# Patient Record
Sex: MALE | Race: WHITE | NOT HISPANIC OR LATINO | Employment: UNEMPLOYED | URBAN - METROPOLITAN AREA
[De-identification: names, ages, dates, MRNs, and addresses within clinical notes are randomized per-mention and may not be internally consistent; named-entity substitution may affect disease eponyms.]

---

## 2022-06-26 ENCOUNTER — HOSPITAL ENCOUNTER (OUTPATIENT)
Dept: TELEMEDICINE | Facility: HOSPITAL | Age: 57
Discharge: HOME | End: 2022-06-26
Payer: MEDICARE

## 2022-06-26 PROCEDURE — G0426 PR INPT TELEHEALTH CONSULT 50M: ICD-10-PCS | Mod: 95,,, | Performed by: PSYCHIATRY & NEUROLOGY

## 2022-06-26 PROCEDURE — G0426 INPT/ED TELECONSULT50: HCPCS | Mod: 95,,, | Performed by: PSYCHIATRY & NEUROLOGY

## 2022-06-26 NOTE — CONSULTS
[facility]  Vascular Neurology  Comprehensive Stroke Center  TeleVascular Neurology Acute Consultation Note      Consults    Consulting Provider: BURAK PROVIDER  Current Providers  No providers found    Patient Location: [facility] TELEMEDICINE ED [facility] Emergency Department  Spoke hospital nurse at bedside with patient assisting consultant.     Patient information was obtained from patient.         Assessment/Plan:   Location: [facility] LKN: 6/25/2022 at 2300 Patient Symptoms are aphasia per ER triage. He woke-up with these symptoms at 1 pm today.   CT images done, not available for review,     CT head per ER physician is no acute intra-cranial process. Chronic microvascular ischemic changes. Bilateral lacunar infarcts in BG. I tried to retreive it and it does not show up in my PACS. He does not appear large vessel occlusion but I recommend obtaining intracranial vessel study. He is out of tPA candidate.     Oral aspirin 81 mg now.  Head of bed flat, IV Fluids, permissive hypertension  MRI brain/MRA head without contrast  Neuro consult if in house available or transfer for neuro consult  Stroke/TIA work-up with MRI brain, Echo, PT/OT/ Speech and swallow evaluation.  If MRA -ve for occlusion, recommend loading Plavix load 300 mg today per POINT/CHANCE protocol today and from tomorrow 81 mg aspirin and plavix 75 mg for 21 days followed by mono antiplatelet.  They will call me if MRA results are abnormal.       Diagnoses:   Stroke like symptoms    STROKE DOCUMENTATION     Acute Stroke Times:   Acute Stroke Times   Last Known Normal Date: 06/25/22  Last Known Normal Time: 2300  Stroke Team Called Date: 06/26/22  Stroke Team Called Time: 1432  Stroke Team Arrival Date: 06/26/22  Stroke Team Arrival Time: 1435  CT completed but images not available for review - spoke to document results: 1440  Alteplase Recommended: No    NIH Scale:  1a. Level of  Consciousness: 0-->Alert, keenly responsive  1b. LOC Questions: 0-->Answers both questions correctly  1c. LOC Commands: 0-->Performs both tasks correctly  2. Best Gaze: 0-->Normal  3. Visual: 0-->No visual loss  4. Facial Palsy: 0-->Normal symmetrical movements  5a. Motor Arm, Left: 0-->No drift, limb holds 90 (or 45) degrees for full 10 secs  5b. Motor Arm, Right: 0-->No drift, limb holds 90 (or 45) degrees for full 10 secs  6a. Motor Leg, Left: 0-->No drift, leg holds 30 degree position for full 5 secs  6b. Motor Leg, Right: 0-->No drift, leg holds 30 degree position for full 5 secs  7. Limb Ataxia: 0-->Absent  8. Sensory: 0-->Normal, no sensory loss  9. Best Language: 0-->No aphasia, normal  10. Dysarthria: 0-->Normal  11. Extinction and Inattention (formerly Neglect): 0-->No abnormality  Total (NIH Stroke Scale): 0     Modified Lizbeth    Sofie Coma Scale:    ABCD2 Score:    [id number] Score:   HAS -BLED Score:   ICH Score:   Hunt & Mccracken Classification:       There were no vitals taken for this visit.  Alteplase Eligible?: Yes  Alteplase Recommendation: Alteplase not recommended due to Outside of treatment window   Possible Interventional Revascularization Candidate? Awaiting CTA results from [facility] for determination     Disposition Recommendation: pending further studies    Subjective:     History of Present Illness:  [facility] Patient Symptoms are aphasia per ER triage. He woke-up with these symptoms at 1 pm today.           Woke up with symptoms?: yes    Recent bleeding noted: no  Does the patient take any Blood Thinners? unknown; no  Medications: No relevant medications      Past Medical History: hypertension, diabetes and hyperlipidemia    Past Surgical History: no major surgeries within the last 2 weeks    Family History: no relevant history    Social History: unable to obtain    Allergies: Allergies have not been reviewed No relevant allergies    Review of  Systems  Objective:   Vitals: There were no vitals taken for this visit. BP: 156/98    CT READ: No    Physical Exam          Recommended the emergency room physician to have a brief discussion with the patient and/or family if available regarding the  risks and benefits of treatment, and to briefly document the occurrence of that discussion in his clinical encounter note.     The attending portion of this evaluation, treatment, and documentation was performed per Analy Sykes MD via audiovisual.    Billing code:  (non-intervention mild to moderate stroke, TIA, some mimics)    · This patient has a critical neurological condition/illness, with some potential for high morbidity and mortality.  · There is a moderate probability for acute neurological change leading to clinical and possibly life-threatening deterioration requiring highest level of physician preparedness for urgent intervention.  · Care was coordinated with other physicians involved in the patient's care.  · Radiologic studies and laboratory data were reviewed and interpreted, and plan of care was re-assessed based on the results.  · Diagnosis, treatment options and prognosis may have been discussed with the patient and/or family members or caregiver.      In your opinion, this was a: Tier 1 Van Negative    Consult End Time: 2:56 PM     Analy Sykes MD  Comprehensive Stroke Center  Vascular Neurology   [facility]

## 2022-06-26 NOTE — HPI
Location: [facility] LKN: 6/25/2022 at 2300 Patient Symptoms are aphasia per ER triage. He woke-up with these symptoms at 1 pm today.

## 2022-06-26 NOTE — SUBJECTIVE & OBJECTIVE
Woke up with symptoms?: yes    Recent bleeding noted: no  Does the patient take any Blood Thinners? unknown; no  Medications: No relevant medications      Past Medical History: hypertension, diabetes and hyperlipidemia    Past Surgical History: no major surgeries within the last 2 weeks    Family History: no relevant history    Social History: unable to obtain    Allergies: Allergies have not been reviewed No relevant allergies    Review of Systems  Objective:   Vitals: There were no vitals taken for this visit. BP: 156/98    CT READ: No    Physical Exam

## 2022-06-27 ENCOUNTER — HOSPITAL ENCOUNTER (EMERGENCY)
Facility: HOSPITAL | Age: 57
Discharge: HOME | End: 2022-06-27
Attending: STUDENT IN AN ORGANIZED HEALTH CARE EDUCATION/TRAINING PROGRAM
Payer: MEDICARE

## 2022-06-27 VITALS
OXYGEN SATURATION: 97 % | TEMPERATURE: 98 F | RESPIRATION RATE: 20 BRPM | HEIGHT: 71 IN | DIASTOLIC BLOOD PRESSURE: 101 MMHG | WEIGHT: 197.31 LBS | BODY MASS INDEX: 27.62 KG/M2 | HEART RATE: 109 BPM | SYSTOLIC BLOOD PRESSURE: 175 MMHG

## 2022-06-27 DIAGNOSIS — N50.819 TESTICULAR PAIN: ICD-10-CM

## 2022-06-27 DIAGNOSIS — G45.9 TIA (TRANSIENT ISCHEMIC ATTACK): Primary | ICD-10-CM

## 2022-06-27 DIAGNOSIS — R53.1 WEAKNESS: ICD-10-CM

## 2022-06-27 LAB
ALBUMIN SERPL-MCNC: 4.1 GM/DL (ref 3.5–5)
ALBUMIN/GLOB SERPL: 1.4 RATIO (ref 1.1–2)
ALP SERPL-CCNC: 91 UNIT/L (ref 40–150)
ALT SERPL-CCNC: 33 UNIT/L (ref 0–55)
AMPHET UR QL SCN: NEGATIVE
APPEARANCE UR: CLEAR
AST SERPL-CCNC: 24 UNIT/L (ref 5–34)
BACTERIA #/AREA URNS AUTO: NORMAL /HPF
BARBITURATE SCN PRESENT UR: NEGATIVE
BASOPHILS # BLD AUTO: 0.05 X10(3)/MCL (ref 0–0.2)
BASOPHILS NFR BLD AUTO: 0.6 %
BENZODIAZ UR QL SCN: NEGATIVE
BILIRUB UR QL STRIP.AUTO: NEGATIVE MG/DL
BILIRUBIN DIRECT+TOT PNL SERPL-MCNC: 0.4 MG/DL
BUN SERPL-MCNC: 10.7 MG/DL (ref 8.4–25.7)
CALCIUM SERPL-MCNC: 10 MG/DL (ref 8.4–10.2)
CANNABINOIDS UR QL SCN: POSITIVE
CHLORIDE SERPL-SCNC: 100 MMOL/L (ref 98–107)
CO2 SERPL-SCNC: 33 MMOL/L (ref 22–29)
COCAINE UR QL SCN: NEGATIVE
COLOR UR AUTO: YELLOW
CREAT SERPL-MCNC: 0.82 MG/DL (ref 0.73–1.18)
EOSINOPHIL # BLD AUTO: 0.13 X10(3)/MCL (ref 0–0.9)
EOSINOPHIL NFR BLD AUTO: 1.7 %
ERYTHROCYTE [DISTWIDTH] IN BLOOD BY AUTOMATED COUNT: 14.1 % (ref 11.5–17)
ETHANOL SERPL-MCNC: <10 MG/DL
FENTANYL UR QL SCN: NEGATIVE
FLUAV AG UPPER RESP QL IA.RAPID: NOT DETECTED
FLUBV AG UPPER RESP QL IA.RAPID: NOT DETECTED
GLOBULIN SER-MCNC: 2.9 GM/DL (ref 2.4–3.5)
GLUCOSE SERPL-MCNC: 173 MG/DL (ref 74–100)
GLUCOSE UR QL STRIP.AUTO: ABNORMAL MG/DL
HCT VFR BLD AUTO: 47.7 % (ref 42–52)
HGB BLD-MCNC: 16 GM/DL (ref 14–18)
IMM GRANULOCYTES # BLD AUTO: 0.09 X10(3)/MCL (ref 0–0.02)
IMM GRANULOCYTES NFR BLD AUTO: 1.2 % (ref 0–0.43)
KETONES UR QL STRIP.AUTO: NEGATIVE MG/DL
LEUKOCYTE ESTERASE UR QL STRIP.AUTO: NEGATIVE UNIT/L
LIPASE SERPL-CCNC: 154 U/L
LYMPHOCYTES # BLD AUTO: 1.78 X10(3)/MCL (ref 0.6–4.6)
LYMPHOCYTES NFR BLD AUTO: 23 %
MCH RBC QN AUTO: 30.9 PG (ref 27–31)
MCHC RBC AUTO-ENTMCNC: 33.5 MG/DL (ref 33–36)
MCV RBC AUTO: 92.3 FL (ref 80–94)
MDMA UR QL SCN: NEGATIVE
MONOCYTES # BLD AUTO: 0.95 X10(3)/MCL (ref 0.1–1.3)
MONOCYTES NFR BLD AUTO: 12.3 %
NEUTROPHILS # BLD AUTO: 4.8 X10(3)/MCL (ref 2.1–9.2)
NEUTROPHILS NFR BLD AUTO: 61.2 %
NITRITE UR QL STRIP.AUTO: NEGATIVE
NRBC BLD AUTO-RTO: 0 %
OPIATES UR QL SCN: NEGATIVE
PCP UR QL: NEGATIVE
PH UR STRIP.AUTO: 7 [PH]
PH UR: 7 [PH] (ref 3–11)
PLATELET # BLD AUTO: 219 X10(3)/MCL (ref 130–400)
PMV BLD AUTO: 11.7 FL (ref 9.4–12.4)
POTASSIUM SERPL-SCNC: 4.9 MMOL/L (ref 3.5–5.1)
PROT SERPL-MCNC: 7 GM/DL (ref 6.4–8.3)
PROT UR QL STRIP.AUTO: ABNORMAL MG/DL
RBC # BLD AUTO: 5.17 X10(6)/MCL (ref 4.7–6.1)
RBC #/AREA URNS AUTO: <5 /HPF
RBC UR QL AUTO: NEGATIVE UNIT/L
SARS-COV-2 RNA RESP QL NAA+PROBE: NOT DETECTED
SODIUM SERPL-SCNC: 140 MMOL/L (ref 136–145)
SP GR UR STRIP.AUTO: 1.01 (ref 1–1.03)
SPECIFIC GRAVITY, URINE AUTO (.000) (OHS): 1.01 (ref 1–1.03)
SQUAMOUS #/AREA URNS AUTO: <5 /HPF
TROPONIN I SERPL-MCNC: <0.01 NG/ML (ref 0–0.04)
UROBILINOGEN UR STRIP-ACNC: 0.2 MG/DL
WBC # SPEC AUTO: 7.8 X10(3)/MCL (ref 4.5–11.5)
WBC #/AREA URNS AUTO: <5 /HPF

## 2022-06-27 PROCEDURE — 80053 COMPREHEN METABOLIC PANEL: CPT | Performed by: PHYSICIAN ASSISTANT

## 2022-06-27 PROCEDURE — 80307 DRUG TEST PRSMV CHEM ANLYZR: CPT | Performed by: STUDENT IN AN ORGANIZED HEALTH CARE EDUCATION/TRAINING PROGRAM

## 2022-06-27 PROCEDURE — 83690 ASSAY OF LIPASE: CPT | Performed by: PHYSICIAN ASSISTANT

## 2022-06-27 PROCEDURE — 99285 EMERGENCY DEPT VISIT HI MDM: CPT | Mod: 25,CS

## 2022-06-27 PROCEDURE — 87636 SARSCOV2 & INF A&B AMP PRB: CPT | Performed by: PHYSICIAN ASSISTANT

## 2022-06-27 PROCEDURE — 84484 ASSAY OF TROPONIN QUANT: CPT | Performed by: PHYSICIAN ASSISTANT

## 2022-06-27 PROCEDURE — 93005 ELECTROCARDIOGRAM TRACING: CPT

## 2022-06-27 PROCEDURE — 81001 URINALYSIS AUTO W/SCOPE: CPT | Performed by: PHYSICIAN ASSISTANT

## 2022-06-27 PROCEDURE — 82077 ASSAY SPEC XCP UR&BREATH IA: CPT | Performed by: STUDENT IN AN ORGANIZED HEALTH CARE EDUCATION/TRAINING PROGRAM

## 2022-06-27 PROCEDURE — 36415 COLL VENOUS BLD VENIPUNCTURE: CPT | Performed by: PHYSICIAN ASSISTANT

## 2022-06-27 PROCEDURE — 85025 COMPLETE CBC W/AUTO DIFF WBC: CPT | Performed by: PHYSICIAN ASSISTANT

## 2022-06-27 PROCEDURE — 63600175 PHARM REV CODE 636 W HCPCS: Performed by: STUDENT IN AN ORGANIZED HEALTH CARE EDUCATION/TRAINING PROGRAM

## 2022-06-27 PROCEDURE — 25000003 PHARM REV CODE 250: Performed by: STUDENT IN AN ORGANIZED HEALTH CARE EDUCATION/TRAINING PROGRAM

## 2022-06-27 RX ORDER — PANTOPRAZOLE SODIUM 40 MG/1
40 TABLET, DELAYED RELEASE ORAL DAILY
COMMUNITY
Start: 2022-06-06

## 2022-06-27 RX ORDER — OXYCODONE AND ACETAMINOPHEN 10; 325 MG/1; MG/1
1 TABLET ORAL 3 TIMES DAILY
COMMUNITY
Start: 2022-06-06

## 2022-06-27 RX ORDER — METFORMIN HYDROCHLORIDE 1000 MG/1
1000 TABLET ORAL 2 TIMES DAILY
COMMUNITY
Start: 2022-05-16

## 2022-06-27 RX ORDER — ATORVASTATIN CALCIUM 40 MG/1
40 TABLET, FILM COATED ORAL DAILY
Qty: 30 TABLET | Refills: 0 | Status: SHIPPED | OUTPATIENT
Start: 2022-06-27 | End: 2022-07-27

## 2022-06-27 RX ORDER — CLOPIDOGREL BISULFATE 75 MG/1
75 TABLET ORAL DAILY
Qty: 30 TABLET | Refills: 0 | Status: SHIPPED | OUTPATIENT
Start: 2022-06-27 | End: 2022-07-27

## 2022-06-27 RX ORDER — FAMOTIDINE 40 MG/1
40 TABLET, FILM COATED ORAL DAILY
COMMUNITY
Start: 2022-04-20

## 2022-06-27 RX ORDER — AMITRIPTYLINE HYDROCHLORIDE 100 MG/1
100 TABLET ORAL NIGHTLY
COMMUNITY
Start: 2022-05-17

## 2022-06-27 RX ORDER — PIOGLITAZONEHYDROCHLORIDE 30 MG/1
30 TABLET ORAL EVERY MORNING
COMMUNITY
Start: 2022-06-22

## 2022-06-27 RX ORDER — AMLODIPINE BESYLATE 10 MG/1
10 TABLET ORAL EVERY MORNING
COMMUNITY
Start: 2022-06-22

## 2022-06-27 RX ORDER — GABAPENTIN 400 MG/1
400 CAPSULE ORAL 3 TIMES DAILY
COMMUNITY
Start: 2022-05-17

## 2022-06-27 RX ORDER — BUSPIRONE HYDROCHLORIDE 15 MG/1
15 TABLET ORAL 2 TIMES DAILY
COMMUNITY
Start: 2022-05-16

## 2022-06-27 RX ORDER — VENLAFAXINE HYDROCHLORIDE 75 MG/1
75 CAPSULE, EXTENDED RELEASE ORAL EVERY MORNING
COMMUNITY
Start: 2022-06-22

## 2022-06-27 RX ORDER — HYDROCHLOROTHIAZIDE 12.5 MG/1
12.5 CAPSULE ORAL EVERY MORNING
COMMUNITY
Start: 2022-06-22

## 2022-06-27 RX ORDER — METFORMIN HYDROCHLORIDE 500 MG/1
500 TABLET ORAL 2 TIMES DAILY
COMMUNITY
Start: 2022-06-22

## 2022-06-27 RX ORDER — ASPIRIN 325 MG
325 TABLET, DELAYED RELEASE (ENTERIC COATED) ORAL
Status: COMPLETED | OUTPATIENT
Start: 2022-06-27 | End: 2022-06-27

## 2022-06-27 RX ORDER — TIZANIDINE 4 MG/1
4 TABLET ORAL DAILY
COMMUNITY
Start: 2022-05-17

## 2022-06-27 RX ORDER — LISINOPRIL 20 MG/1
20 TABLET ORAL EVERY MORNING
COMMUNITY
Start: 2022-06-22

## 2022-06-27 RX ORDER — TESTOSTERONE CYPIONATE 200 MG/ML
10 INJECTION, SOLUTION INTRAMUSCULAR
COMMUNITY
Start: 2022-04-16

## 2022-06-27 RX ADMIN — ASPIRIN 325 MG: 325 TABLET, COATED ORAL at 07:06

## 2022-06-27 RX ADMIN — SODIUM CHLORIDE, POTASSIUM CHLORIDE, SODIUM LACTATE AND CALCIUM CHLORIDE 1000 ML: 600; 310; 30; 20 INJECTION, SOLUTION INTRAVENOUS at 07:06

## 2022-06-27 NOTE — FIRST PROVIDER EVALUATION
"Medical screening exam completed.  I have conducted a focused provider triage encounter, findings are as follows:    Brief history of present illness:  Mr. Norman presented to the ED with abdominal pain, dysuria, and weakness.  No fever.  Patient reports seen at [facility] yesterday for TIA.  Patient reports that he signed out AMA from the floor because nurses would not give him his night meds.    Vitals:    06/27/22 1446   BP: (!) 164/88   Pulse: (!) 120   Resp: 18   Temp: 98.4 °F (36.9 °C)   TempSrc: Oral   SpO2: 98%   Weight: 89.5 kg (197 lb 5 oz)   Height: 5' 10.87" (1.8 m)       Pertinent physical exam:  Patient is not toxic appearing.  No respiratory distress.    Brief workup plan:  Labs, UA, Imaginin    Preliminary workup initiated; this workup will be continued and followed by the physician or advanced practice provider that is assigned to the patient when roomed.  "

## 2022-06-28 NOTE — DISCHARGE INSTRUCTIONS
Follow up with your primary care physician.     Follow up with your neurologist.     On imaging done at [facility], you were found to have a dilated aorta, this will need follow up and close monitoring.  Talk to your primary care physician.     Take medication as prescribed.    Return to the emergency department if any new symptoms, worsening pain, weakness, numbness, nausea, vomiting, bleeding, or any other symptoms.

## 2022-06-28 NOTE — ED PROVIDER NOTES
Encounter Date: 6/27/2022       History     Chief Complaint   Patient presents with    Abdominal Pain     Constipation, difficulty urinating, intermittent testicular pain x 1 month, pt wants further testing, supposed to have a MRI on head at [facility], out of medicines x 3 days. Pt worked up for cva at [facility], left [facility] last night, pt here for MRI of head     The history is provided by the patient.   Abdominal Pain  The current episode started several weeks ago. The onset of the illness was gradual. The problem has not changed since onset.The abdominal pain is generalized. The abdominal pain does not radiate. The abdominal pain is relieved by nothing.   The other symptoms of the illness do not include fever, shortness of breath or dysuria.   Significant associated medical issues do not include GERD.      Mr. Edward Norman is a 56-year-old male with past medical history of hypertension hyperlipidemia, diabetes history of TIA previously presents to the emergency department for multiple medical complaints.  He reports chronic abdominal pain, and intermittent testicular pain x1 month.  Denies any current testicular swelling.  Denies any nausea, vomiting, fever.  Patient with left against medical advice from [facility] yesterday and states he left because he was not getting his medication.  Patient had a full CVA workup including MRI done.  Spoke with the patient and his sister are about risks of leaving against medical advice.  Fortunately were able to request records from [facility] and review the complete workup he has had done.  He had initially presented there for aphasia, was given an NIH of 2 had evaluation by neurology [facility] stroke, CT of the head and MRI were obtained.  Patient was not a tPA candidate.  Concurrently patient without any new neuro deficits.    CT of the head from 06/26/2022 in  Impression:  Chronic small-vessel ischemic changes in the periventricular distribution  bilateral multiple small lacunar infarcts.  These acute infarcts are small and difficult to determine whether 1 of these infarcts could be acute.  Follow-up MRI may be helpful.    MRI of the brain 06/26/2022  Findings:  There significant chronic small-vessel ischemic changes in the periventricular distribution bilaterally.  This small old infarct involving the left corona radiata.  There is a small old infarct involving the left thalamus.  No evidence of acute infarct.  No intracranial hemorrhage.  No midline shift or hydrocephalus.  The visualized portions of the orbits and paranasal sinuses are unremarkable.  Internal auditory canals and pituitary fossa are unremarkable.  Impression:  Chronic small-vessel ischemic changes.  No evidence of acute infarct.    MRI neck carotid 06/26/2022  No evidence of carotid stenosis.  Ascending aorta is prominent measuring 4.7 cm.     MRA of the brain:  Findings suggesting small vessel atherosclerotic changes along the distal branch of the middle cerebral arteries bilaterally also mid atherosclerotic changes involving the distal portions of the posterior cerebral arteries bilaterally.    EKG from outside facility 06/26/2022.  Sinus tachycardia.  Rate 104. Normal intervals.  No STEMI.    Review of patient's allergies indicates:  No Known Allergies  History reviewed. No pertinent past medical history.  History reviewed. No pertinent surgical history.  History reviewed. No pertinent family history.     Review of Systems   Constitutional: Negative for fever.   HENT: Negative for sore throat.    Eyes: Negative for visual disturbance.   Respiratory: Negative for shortness of breath.    Cardiovascular: Negative for chest pain.   Gastrointestinal: Positive for abdominal pain.   Genitourinary: Negative for dysuria.   Musculoskeletal: Negative for joint swelling.   Skin: Negative for rash.   Neurological: Negative for weakness.   Psychiatric/Behavioral: Negative for confusion.   All other  systems reviewed and are negative.      Physical Exam     Initial Vitals [06/27/22 1446]   BP Pulse Resp Temp SpO2   (!) 164/88 (!) 120 18 98.4 °F (36.9 °C) 98 %      MAP       --         Physical Exam    Nursing note and vitals reviewed.  Constitutional: He appears well-developed and well-nourished. He is not diaphoretic. No distress.   HENT:   Head: Normocephalic and atraumatic.   Eyes: Conjunctivae and EOM are normal. Pupils are equal, round, and reactive to light.   Neck:   Normal range of motion.  Cardiovascular: Normal rate, regular rhythm, normal heart sounds and intact distal pulses.   No murmur heard.  Pulmonary/Chest: Breath sounds normal. No respiratory distress. He has no wheezes. He has no rales.   Abdominal: Abdomen is soft. He exhibits no distension. There is no abdominal tenderness.   Genitourinary:    Genitourinary Comments: No testicular pain, swelling, erythema noted.      Musculoskeletal:         General: No tenderness or edema. Normal range of motion.      Cervical back: Normal range of motion.     Neurological: He is alert and oriented to person, place, and time. He displays normal reflexes. No cranial nerve deficit or sensory deficit.   Skin: Skin is warm and dry. Capillary refill takes less than 2 seconds. No rash noted. No erythema.   Psychiatric: He has a normal mood and affect.         ED Course   Procedures  Labs Reviewed   COMPREHENSIVE METABOLIC PANEL - Abnormal; Notable for the following components:       Result Value    Carbon Dioxide 33 (*)     Glucose Level 173 (*)     All other components within normal limits   LIPASE - Abnormal; Notable for the following components:    Lipase Level 154 (*)     All other components within normal limits   URINALYSIS, REFLEX TO URINE CULTURE - Abnormal; Notable for the following components:    Protein, UA Trace (*)     Glucose, UA 2+ (*)     All other components within normal limits   CBC WITH DIFFERENTIAL - Abnormal; Notable for the following  components:    IG# 0.09 (*)     IG% 1.2 (*)     All other components within normal limits   DRUG SCREEN, URINE (BEAKER) - Abnormal; Notable for the following components:    Cannabinoids, Urine Positive (*)     All other components within normal limits    Narrative:     Cut off concentrations:    Amphetamines - 1000 ng/ml  Barbiturates - 200 ng/ml  Benzodiazepine - 200 ng/ml  Cannabinoids (THC) - 50 ng/ml  Cocaine - 300 ng/ml  Fentanyl - 1.0 ng/ml  MDMA - 500 ng/ml  Opiates - 300 ng/ml   Phencyclidine (PCP) - 25 ng/ml    Specimen submitted for drug analysis and tested for pH and specific gravity in order to evaluate sample integrity. Suspect tampering if specific gravity is <1.003 and/or pH is not within the range of 4.5 - 8.0  False negatives may result form substances such as bleach added to urine.  False positives may result for the presence of a substance with similar chemical structure to the drug or its metabolite.    This test provides only a PRELIMINARY analytical test result. A more specific alternate chemical method must be used in order to obtain a confirmed analytical result. Gas chromatography/mass spectrometry (GC/MS) is the preferred confirmatory method. Other chemical confirmation methods are available. Clinical consideration and professional judgement should be applied to any drug of abuse test result, particularly when preliminary positive results are used.    Positive results will be confirmed only at the physicians request. Unconfirmed screening results are to be used only for medical purposes (treatment).        COVID/FLU A&B PCR - Normal   TROPONIN I - Normal   URINALYSIS, MICROSCOPIC - Normal   ALCOHOL,MEDICAL (ETHANOL) - Normal   CBC W/ AUTO DIFFERENTIAL    Narrative:     The following orders were created for panel order CBC auto differential.  Procedure                               Abnormality         Status                     ---------                               -----------          ------                     CBC with Differential[[id number]]        Abnormal            Final result                 Please view results for these tests on the individual orders.     EKG Readings: (Independently Interpreted)   Sinus tachycardia.  Rate of 117.  Normal intervals.  No STEMI.      ECG Results          EKG 12-lead (Final result)  Result time 06/28/22 02:15:50    Final result by Interface, Lab In [facility] (06/28/22 02:15:50)                 Narrative:    Test Reason : R53.1,    Vent. Rate : 117 BPM     Atrial Rate : 117 BPM     P-R Int : 186 ms          QRS Dur : 082 ms      QT Int : 302 ms       P-R-T Axes : 043 073 047 degrees     QTc Int : 421 ms    Sinus tachycardia  Cannot rule out Anterior infarct ,age undetermined  Abnormal ECG  No previous ECGs available  Confirmed by Mario Rowley MD ([id number]) on 6/28/2022 2:15:30 AM    Referred By:             Confirmed By:Mario Rowley MD                             EKG 12-LEAD (Final result)  Result time 07/01/22 10:28:37    Final result by Unknown User (07/01/22 10:28:37)                                Imaging Results          US Scrotum And Testicles (Final result)  Result time 06/28/22 08:05:59    Final result by Phu Chapa MD (06/28/22 08:05:59)                 Impression:      Normal scrotal ultrasound.    No significant discrepancy between my interpretation and the preliminary radiology report.      Electronically signed by: Phu Chapa  Date:    06/28/2022  Time:    08:05             Narrative:    EXAMINATION:  US SCROTUM AND TESTICLES    CLINICAL HISTORY:  Testicular pain, unspecifiedtesticular pain;    TECHNIQUE:  Gray-scale, color Doppler, and spectral waveform tracings of the scrotum.    COMPARISON:  No relevant comparison studies available at the time of dictation.    FINDINGS:  No intratesticular mass identified. Testicular blood flow documented bilaterally on Doppler evaluation. Epididymides within normal limits. No significant  hydrocele.    Measurements:    Right testicle: 3.3 x 2.0 x 2.5 cm    Left testicle: 3.9 x 1.6 x 2.8 cm                    Preliminary result by Phu Chapa MD (06/27/22 20:03:12)                 Narrative:    START OF REPORT:  Technique: Real time grey scale and color doppler ultrasound was performed on the scrotum and contents.    Comparison: None.    Clinical history: Testicular pain - left lateral.    Findings:  Scrotum:  Right testicle: The right testicle measures 3.3 x 2.0 x 2.5 cm (SAG x AP x TRV). The right testicle appears unremarkable.  Blood flow:  Intratesticular arterial blood flow: There is normal spontaneous and phasic flow.  Intratesticular venous blood flow: There is normal spontaneous and phasic flow.  Right epididymis: Unremarkable.  Left testicle: The left testicle measures 3.9 x 1.6 x 2.8 cm (SAG x AP x TRV). The left testicle appears unremarkable.  Blood flow:  Intratesticular arterial blood flow: There is normal spontaneous and phasic flow.  Intratesticular venous blood flow: There is normal spontaneous and phasic flow.  Left epididymis: Unremarkable.      Impression:  1. Unremarkable scrotal ultrasound. No specific evidence of testicular torsion.                                 X-Ray Chest PA And Lateral (Final result)  Result time 06/27/22 15:52:24    Final result by Eligio Palomino MD (06/27/22 15:52:24)                 Impression:      No acute cardiopulmonary process.      Electronically signed by: Eligio Palomino  Date:    06/27/2022  Time:    15:52             Narrative:    EXAMINATION:  XR CHEST PA AND LATERAL    CLINICAL HISTORY:  Weakness    TECHNIQUE:  PA and lateral views of the chest.    COMPARISON:  Available.    FINDINGS:  The cardiomediastinal silhouette is normal in size and contour. Pulmonary vascularity is within normal limits. The lungs are without focal consolidation, pleural effusion, or pneumothorax.    The imaged osseous structures and soft tissues are without acute  abnormality.                                 Medications   lactated ringers bolus 1,000 mL (1,000 mLs Intravenous New Bag 6/27/22 1915)   aspirin EC tablet 325 mg (325 mg Oral Given 6/27/22 1915)     Medical Decision Making:   ED Management:  Patient is a 56-year-old male who presents to the emergency department for further evaluation after leaving its medical advice from [facility].  He presented there for what appears to be a TIA, sx resolved, imaging without acute infarct.  See HPI.  See physical exam.  Imaging studies from [facility] reviewed.  Extensive amount of time counseling, obtaining history, and reviewing completed workup with the patient and his sister.  Fortunately repeat ultrasound here without evidence of testicular torsion or other abnormality.  Abdominal pain controlled.  Discuss avoiding alcohol, THC.  Discussed diet and exercise recommendations.  Patient has had a complete CVA workup, has an outpatient neurologist that he sees.  I reviewed his medications, he does not appear to be on high-intensity statin.  Will add high-intensity statin refill Plavix and continue his other medications including blood pressure medications.  I discussed the importance of following up with primary care physician.  Discussed the importance of medication compliance, adhering to recommendations and the importance of follow-up with his primary care physician and neurologist.  Discussed patient's imaging appears consistent with multiple previous CVAs the no acute infarcts have been noted.  He is currently neurovascularly intact, and fortunately without new acute symptoms, does not require repeat admission at this time.  Answered all questions at this time.  Answered the patient and his sister's questions at this time.  Discussed return precautions.  Hemodynamically stable for continued outpatient management with strict return precautions.    Additional MDM:     NIH Stroke Scale:   Interval =  baseline (upon arrival/admit)  Level of consciousness = 0 - alert  LOC questions = 0 - answers both correctly  Best gaze = 0 - normal  Visual = 0 - no visual loss  Facial palsy = 0 - normal  Motor left arm =  0 - no drift  Motor right arm =  0 - no drift  Motor left leg = 0 - no drift  Motor right leg =  0 - no drift  Limb ataxia = 0 - absent  Sensory = 0 - normal  Best language = 0 - no aphasia  Dysarthria = 0 - normal articulation  Extinction and inattention = 0 - no neglect  NIH Stroke Scale Total = 0                   Clinical Impression:   Final diagnoses:  [R53.1] Weakness  [N50.819] Testicular pain  [N50.819] Testicular pain - testicular pain  [G45.9] TIA (transient ischemic attack) (Primary)          ED Disposition Condition    Discharge Stable        ED Prescriptions     Medication Sig Dispense Start Date End Date Auth. Provider    clopidogreL (PLAVIX) 75 mg tablet Take 1 tablet (75 mg total) by mouth once daily. 30 tablet 6/27/2022 7/27/2022 Max Walters MD    atorvastatin (LIPITOR) 40 MG tablet Take 1 tablet (40 mg total) by mouth once daily. 30 tablet 6/27/2022 7/27/2022 Max Walters MD        Follow-up Information     Follow up With Specialties Details Why Contact Info    Dennys Dan MD Neurology   [address] [place] [address] DR  [address]  NEUROSCIENCE CENTER [facility] [id number]  [phone]      [facility]    [address] [facility] [id number] [phone]             Max Walters MD  07/08/22 3713

## 2023-01-06 ENCOUNTER — HOSPITAL ENCOUNTER (EMERGENCY)
Facility: HOSPITAL | Age: 58
Discharge: HOME | End: 2023-01-06
Attending: INTERNAL MEDICINE
Payer: COMMERCIAL

## 2023-01-06 VITALS
TEMPERATURE: 99 F | SYSTOLIC BLOOD PRESSURE: 142 MMHG | RESPIRATION RATE: 20 BRPM | OXYGEN SATURATION: 99 % | HEART RATE: 69 BPM | DIASTOLIC BLOOD PRESSURE: 100 MMHG

## 2023-01-06 DIAGNOSIS — Z86.79 HISTORY OF ATRIAL FIBRILLATION: ICD-10-CM

## 2023-01-06 DIAGNOSIS — Z00.8 MEDICAL CLEARANCE FOR INCARCERATION: ICD-10-CM

## 2023-01-06 DIAGNOSIS — I49.1 PAC (PREMATURE ATRIAL CONTRACTION): Primary | ICD-10-CM

## 2023-01-06 LAB
ALBUMIN SERPL-MCNC: 4.7 G/DL (ref 3.5–5)
ALBUMIN/GLOB SERPL: 1.4 RATIO (ref 1.1–2)
ALP SERPL-CCNC: 81 UNIT/L (ref 40–150)
ALT SERPL-CCNC: 31 UNIT/L (ref 0–55)
AST SERPL-CCNC: 22 UNIT/L (ref 5–34)
BASOPHILS # BLD AUTO: 0.07 X10(3)/MCL (ref 0–0.2)
BASOPHILS NFR BLD AUTO: 0.9 %
BILIRUBIN DIRECT+TOT PNL SERPL-MCNC: 0.6 MG/DL
BUN SERPL-MCNC: 13.6 MG/DL (ref 8.4–25.7)
CALCIUM SERPL-MCNC: 10.4 MG/DL (ref 8.4–10.2)
CHLORIDE SERPL-SCNC: 104 MMOL/L (ref 98–107)
CO2 SERPL-SCNC: 25 MMOL/L (ref 22–29)
CREAT SERPL-MCNC: 0.96 MG/DL (ref 0.73–1.18)
EOSINOPHIL # BLD AUTO: 0.31 X10(3)/MCL (ref 0–0.9)
EOSINOPHIL NFR BLD AUTO: 4.1 %
ERYTHROCYTE [DISTWIDTH] IN BLOOD BY AUTOMATED COUNT: 13.6 % (ref 11.6–14.4)
GFR SERPLBLD CREATININE-BSD FMLA CKD-EPI: >90 MLS/MIN/1.73/M2
GLOBULIN SER-MCNC: 3.4 GM/DL (ref 2.4–3.5)
GLUCOSE SERPL-MCNC: 122 MG/DL (ref 74–100)
HCT VFR BLD AUTO: 42.1 % (ref 42–52)
HGB BLD-MCNC: 14.7 GM/DL (ref 14–18)
IMM GRANULOCYTES # BLD AUTO: 0.07 X10(3)/MCL (ref 0–0.04)
IMM GRANULOCYTES NFR BLD AUTO: 0.9 %
LYMPHOCYTES # BLD AUTO: 2.45 X10(3)/MCL (ref 0.6–4.6)
LYMPHOCYTES NFR BLD AUTO: 32.6 %
MCH RBC QN AUTO: 32 PG
MCHC RBC AUTO-ENTMCNC: 34.9 MG/DL (ref 33–36)
MCV RBC AUTO: 91.7 FL (ref 80–94)
MONOCYTES # BLD AUTO: 0.82 X10(3)/MCL (ref 0.1–1.3)
MONOCYTES NFR BLD AUTO: 10.9 %
NEUTROPHILS # BLD AUTO: 3.79 X10(3)/MCL (ref 2.1–9.2)
NEUTROPHILS NFR BLD AUTO: 50.6 %
NRBC BLD AUTO-RTO: 0 % (ref 0–1)
PLATELET # BLD AUTO: 173 X10(3)/MCL (ref 140–371)
PMV BLD AUTO: 12.6 FL (ref 9.4–12.4)
POCT GLUCOSE: 113 MG/DL (ref 70–110)
POTASSIUM SERPL-SCNC: 3.8 MMOL/L (ref 3.5–5.1)
PROT SERPL-MCNC: 8.1 GM/DL (ref 6.4–8.3)
RBC # BLD AUTO: 4.59 X10(6)/MCL (ref 4.7–6.1)
SODIUM SERPL-SCNC: 142 MMOL/L (ref 136–145)
TSH SERPL-ACNC: 2.57 UIU/ML (ref 0.35–4.94)
WBC # SPEC AUTO: 7.5 X10(3)/MCL (ref 4.5–11.5)

## 2023-01-06 PROCEDURE — 93005 ELECTROCARDIOGRAM TRACING: CPT

## 2023-01-06 PROCEDURE — 82962 GLUCOSE BLOOD TEST: CPT

## 2023-01-06 PROCEDURE — 85025 COMPLETE CBC W/AUTO DIFF WBC: CPT

## 2023-01-06 PROCEDURE — 99284 EMERGENCY DEPT VISIT MOD MDM: CPT | Mod: 25

## 2023-01-06 PROCEDURE — 84443 ASSAY THYROID STIM HORMONE: CPT

## 2023-01-06 PROCEDURE — 80053 COMPREHEN METABOLIC PANEL: CPT

## 2023-01-07 NOTE — ED PROVIDER NOTES
Encounter Date: 1/6/2023       History     Chief Complaint   Patient presents with    Shortness of Breath     Pt c/o intermittent shortness of breath x 1 month. He states he was assessed by the [facility]  And      Emerson is a 57 year old male with PMH of HTN, T2DM, PAF, HLD, CVA (last year), and TIAs who presents to the ED following abnormal EKG while being assessed by the [facility] doctor. He states that he has been having intermittent palpitations with associated fatigue, weakness, left chest tightness, and dyspnea for the past few months. Last episode of palpitations was yesterday. He denies diaphoresis, nausea, vomiting, lightheadedness, dizziness, syncope, numbness, orthopnea, PND. Takes  for PAF, and Lisinopril 20, Metoprolol 25, amlodipine 10, and HCTZ 12.5 for HTN.      The history is provided by the patient and the police.   Review of patient's allergies indicates:  No Known Allergies  Past Medical History:   Diagnosis Date    Diabetes mellitus     Hypertension     Paroxysmal atrial fibrillation      Past Surgical History:   Procedure Laterality Date    BACK SURGERY       History reviewed. No pertinent family history.  Social History     Substance Use Topics    Alcohol use: Not Currently     Review of Systems   Constitutional:  Positive for fatigue. Negative for chills and fever.   Respiratory:  Positive for chest tightness and shortness of breath.    Cardiovascular:  Positive for palpitations. Negative for chest pain and leg swelling.   Gastrointestinal:  Negative for constipation, nausea and vomiting.   Genitourinary:  Negative for dysuria.   Skin:  Negative for rash.   Neurological:  Positive for weakness. Negative for dizziness, light-headedness, numbness and headaches.     Physical Exam     Initial Vitals [01/06/23 1943]   BP Pulse Resp Temp SpO2   (!) 166/102 89 17 99 °F (37.2 °C) 98 %      MAP       --         Physical Exam    Nursing note and vitals reviewed.  Constitutional: He appears  well-developed and well-nourished. He is not diaphoretic. No distress.   HENT:   Head: Normocephalic and atraumatic.   Nose: Nose normal.   Mouth/Throat: Oropharynx is clear and moist.   Eyes: EOM are normal. Pupils are equal, round, and reactive to light.   Neck: Neck supple.   Normal range of motion.  Cardiovascular:  Normal rate, regular rhythm, normal heart sounds and intact distal pulses.           No murmur heard.  Pulmonary/Chest: Breath sounds normal. No respiratory distress.   Abdominal: Abdomen is soft. Bowel sounds are normal. There is no abdominal tenderness.   Musculoskeletal:         General: No edema. Normal range of motion.      Cervical back: Normal range of motion and neck supple.     Neurological: He is alert and oriented to person, place, and time. GCS score is 15. GCS eye subscore is 4. GCS verbal subscore is 5. GCS motor subscore is 6.   Skin: Skin is warm and dry. Capillary refill takes less than 2 seconds.   Psychiatric: He has a normal mood and affect.       ED Course   Procedures  Labs Reviewed   COMPREHENSIVE METABOLIC PANEL - Abnormal; Notable for the following components:       Result Value    Glucose Level 122 (*)     Calcium Level Total 10.4 (*)     All other components within normal limits   CBC WITH DIFFERENTIAL - Abnormal; Notable for the following components:    RBC 4.59 (*)     MPV 12.6 (*)     IG# 0.07 (*)     All other components within normal limits   POCT GLUCOSE - Abnormal; Notable for the following components:    POCT Glucose 113 (*)     All other components within normal limits   TSH - Normal   CBC W/ AUTO DIFFERENTIAL    Narrative:     The following orders were created for panel order CBC Auto Differential.  Procedure                               Abnormality         Status                     ---------                               -----------         ------                     CBC with Differential[[id number]]        Abnormal            Final result                 Please  view results for these tests on the individual orders.   EXTRA TUBES    Narrative:     The following orders were created for panel order EXTRA TUBES.  Procedure                               Abnormality         Status                     ---------                               -----------         ------                     Light Blue Top Hold[[id number]]                              In process                 Red Top Hold[[id number]]                                     In process                 Gold Top Hold[[id number]]                                    In process                   Please view results for these tests on the individual orders.   LIGHT BLUE TOP HOLD   RED TOP HOLD   GOLD TOP HOLD     EKG Readings: (Independently Interpreted)   Initial Reading: No STEMI. Rhythm: Normal Sinus Rhythm. Heart Rate: 78. Ectopy: Rare PACs. Conduction: Normal. Axis: Normal. Clinical Impression: Normal Sinus Rhythm with PACs     Imaging Results    None          Medications - No data to display             Attending Attestation:   Physician Attestation Statement for Resident:  As the supervising MD   Physician Attestation Statement: I have personally seen and examined this patient.   I agree with the above history.  -:   As the supervising MD I agree with the above PE.     As the supervising MD I agree with the above treatment, course, plan, and disposition.    I have reviewed and agree with the residents interpretation of the following: lab data and EKG.             Attending ED Notes:   I performed a face to face evaluation of the patient Edward Norman and my history reveal Hx of HTN, A Fin, CAD, Hyperlipidemia, presents with abnormal EKG and palpitations from [facility] the physical exam was unremarkable.  I reviewed the history, physical exam and diagnostic studies performed by the resident Dr. GEOVANNI Ricketts and I concur with the diagnosis and assessment. This case was initially evaluated by Dr. Ricketts  under my supervision and I agree  with the documentation and plan.    Total teaching time: 12 min                     Clinical Impression:   Final diagnoses:  [Z00.8] Medical clearance for incarceration  [I49.1] PAC (premature atrial contraction) (Primary)  [Z86.79] History of atrial fibrillation        ED Disposition Condition    Discharge Stable          ED Prescriptions    None       Follow-up Information       Follow up With Specialties Details Why Contact Info    [facility] Emergency Medicine  If symptoms worsen [address] [place] [address] [phone]             Lucien Polo MD  01/06/23 8131